# Patient Record
Sex: FEMALE | Race: OTHER | HISPANIC OR LATINO
[De-identification: names, ages, dates, MRNs, and addresses within clinical notes are randomized per-mention and may not be internally consistent; named-entity substitution may affect disease eponyms.]

---

## 2018-04-11 PROBLEM — Z00.00 ENCOUNTER FOR PREVENTIVE HEALTH EXAMINATION: Status: ACTIVE | Noted: 2018-04-11

## 2018-05-01 ENCOUNTER — APPOINTMENT (OUTPATIENT)
Dept: OTOLARYNGOLOGY | Facility: CLINIC | Age: 58
End: 2018-05-01
Payer: COMMERCIAL

## 2018-05-01 VITALS
BODY MASS INDEX: 29.44 KG/M2 | WEIGHT: 160 LBS | DIASTOLIC BLOOD PRESSURE: 82 MMHG | HEIGHT: 62 IN | HEART RATE: 64 BPM | RESPIRATION RATE: 16 BRPM | SYSTOLIC BLOOD PRESSURE: 156 MMHG

## 2018-05-01 DIAGNOSIS — Z78.9 OTHER SPECIFIED HEALTH STATUS: ICD-10-CM

## 2018-05-01 PROCEDURE — 99204 OFFICE O/P NEW MOD 45 MIN: CPT

## 2018-05-01 RX ORDER — IBUPROFEN 200 MG/1
TABLET, COATED ORAL
Refills: 0 | Status: ACTIVE | COMMUNITY

## 2018-05-07 ENCOUNTER — FORM ENCOUNTER (OUTPATIENT)
Age: 58
End: 2018-05-07

## 2018-05-08 ENCOUNTER — APPOINTMENT (OUTPATIENT)
Dept: ULTRASOUND IMAGING | Facility: IMAGING CENTER | Age: 58
End: 2018-05-08
Payer: MEDICAID

## 2018-05-08 ENCOUNTER — OUTPATIENT (OUTPATIENT)
Dept: OUTPATIENT SERVICES | Facility: HOSPITAL | Age: 58
LOS: 1 days | End: 2018-05-08
Payer: MEDICAID

## 2018-05-08 ENCOUNTER — RESULT REVIEW (OUTPATIENT)
Age: 58
End: 2018-05-08

## 2018-05-08 DIAGNOSIS — E04.2 NONTOXIC MULTINODULAR GOITER: ICD-10-CM

## 2018-05-08 PROCEDURE — 88173 CYTOPATH EVAL FNA REPORT: CPT

## 2018-05-08 PROCEDURE — 10022: CPT

## 2018-05-08 PROCEDURE — 76942 ECHO GUIDE FOR BIOPSY: CPT

## 2018-05-08 PROCEDURE — 76942 ECHO GUIDE FOR BIOPSY: CPT | Mod: 26

## 2018-05-08 PROCEDURE — 88172 CYTP DX EVAL FNA 1ST EA SITE: CPT

## 2018-05-08 PROCEDURE — 88173 CYTOPATH EVAL FNA REPORT: CPT | Mod: 26

## 2018-05-09 LAB — NON-GYNECOLOGICAL CYTOLOGY STUDY: SIGNIFICANT CHANGE UP

## 2018-05-10 ENCOUNTER — RESULT REVIEW (OUTPATIENT)
Age: 58
End: 2018-05-10

## 2018-09-13 ENCOUNTER — APPOINTMENT (OUTPATIENT)
Dept: ULTRASOUND IMAGING | Facility: CLINIC | Age: 58
End: 2018-09-13
Payer: MEDICAID

## 2018-09-13 ENCOUNTER — OUTPATIENT (OUTPATIENT)
Dept: OUTPATIENT SERVICES | Facility: HOSPITAL | Age: 58
LOS: 1 days | End: 2018-09-13
Payer: MEDICAID

## 2018-09-13 DIAGNOSIS — Z00.8 ENCOUNTER FOR OTHER GENERAL EXAMINATION: ICD-10-CM

## 2018-09-13 PROCEDURE — 76536 US EXAM OF HEAD AND NECK: CPT

## 2018-09-13 PROCEDURE — 76536 US EXAM OF HEAD AND NECK: CPT | Mod: 26

## 2018-09-25 ENCOUNTER — APPOINTMENT (OUTPATIENT)
Dept: OTOLARYNGOLOGY | Facility: CLINIC | Age: 58
End: 2018-09-25
Payer: MEDICAID

## 2018-09-25 VITALS
SYSTOLIC BLOOD PRESSURE: 151 MMHG | HEIGHT: 62 IN | WEIGHT: 166.45 LBS | HEART RATE: 66 BPM | DIASTOLIC BLOOD PRESSURE: 83 MMHG | BODY MASS INDEX: 30.63 KG/M2

## 2018-09-25 PROCEDURE — 99214 OFFICE O/P EST MOD 30 MIN: CPT

## 2018-11-07 ENCOUNTER — OUTPATIENT (OUTPATIENT)
Dept: OUTPATIENT SERVICES | Facility: HOSPITAL | Age: 58
LOS: 1 days | End: 2018-11-07

## 2018-11-07 VITALS
TEMPERATURE: 98 F | DIASTOLIC BLOOD PRESSURE: 70 MMHG | HEIGHT: 58 IN | WEIGHT: 162.04 LBS | SYSTOLIC BLOOD PRESSURE: 124 MMHG | HEART RATE: 72 BPM | RESPIRATION RATE: 16 BRPM

## 2018-11-07 DIAGNOSIS — E04.2 NONTOXIC MULTINODULAR GOITER: ICD-10-CM

## 2018-11-07 DIAGNOSIS — Z98.890 OTHER SPECIFIED POSTPROCEDURAL STATES: Chronic | ICD-10-CM

## 2018-11-07 LAB
HCT VFR BLD CALC: 44.3 % — SIGNIFICANT CHANGE UP (ref 34.5–45)
HGB BLD-MCNC: 14.7 G/DL — SIGNIFICANT CHANGE UP (ref 11.5–15.5)
MCHC RBC-ENTMCNC: 30.9 PG — SIGNIFICANT CHANGE UP (ref 27–34)
MCHC RBC-ENTMCNC: 33.2 % — SIGNIFICANT CHANGE UP (ref 32–36)
MCV RBC AUTO: 93.1 FL — SIGNIFICANT CHANGE UP (ref 80–100)
NRBC # FLD: 0 — SIGNIFICANT CHANGE UP
PLATELET # BLD AUTO: 215 K/UL — SIGNIFICANT CHANGE UP (ref 150–400)
PMV BLD: 11.5 FL — SIGNIFICANT CHANGE UP (ref 7–13)
RBC # BLD: 4.76 M/UL — SIGNIFICANT CHANGE UP (ref 3.8–5.2)
RBC # FLD: 12.9 % — SIGNIFICANT CHANGE UP (ref 10.3–14.5)
WBC # BLD: 4.67 K/UL — SIGNIFICANT CHANGE UP (ref 3.8–10.5)
WBC # FLD AUTO: 4.67 K/UL — SIGNIFICANT CHANGE UP (ref 3.8–10.5)

## 2018-11-07 NOTE — H&P PST ADULT - GASTROINTESTINAL DETAILS
no guarding/no distention/no rebound tenderness/no masses palpable/soft/bowel sounds normal/nontender

## 2018-11-07 NOTE — H&P PST ADULT - NEGATIVE GASTROINTESTINAL SYMPTOMS
no change in bowel habits/no hiccoughs/no vomiting/no constipation/no abdominal pain/no melena/no jaundice/no diarrhea/no nausea

## 2018-11-07 NOTE — H&P PST ADULT - NEGATIVE OPHTHALMOLOGIC SYMPTOMS
no pain R/no photophobia/no diplopia/no blurred vision L/no lacrimation R/no irritation L/no irritation R/no lacrimation L/no discharge R/no discharge L/no pain L

## 2018-11-07 NOTE — H&P PST ADULT - HISTORY OF PRESENT ILLNESS
>1 year felt right thyroid mass, Dx 9 months ago S/P  c/O discomfort when swallowing x 6 weeks 59 y/o  female non English speaking with no significant PMH: presents to PST for pre op evaluation stated that she felt a felt right thyroid mass > 1 year. S/P biopsy of thyroid on 07/2018, Pt now C/O discomfort when swallowing x 6 weeks. Scheduled for right thyroid lobectomy on 11/21/18

## 2018-11-07 NOTE — H&P PST ADULT - RS GEN PE MLT RESP DETAILS PC
no rales/no subcutaneous emphysema/airway patent/breath sounds equal/no chest wall tenderness/no rhonchi/no wheezes/good air movement/clear to auscultation bilaterally/respirations non-labored/no intercostal retractions

## 2018-11-07 NOTE — H&P PST ADULT - PROBLEM SELECTOR PLAN 1
Scheduled for right thyroid lobectomy on 11/21/18. Pre op instructions, famotidine, chlorhexidine gluconate soap given and explained. Pt verbalized understanding.

## 2018-11-07 NOTE — H&P PST ADULT - NEGATIVE ENMT SYMPTOMS
no tinnitus/no nasal discharge/no dry mouth/no nasal congestion/no vertigo/no nasal obstruction/no post-nasal discharge/no nose bleeds/no sinus symptoms/no gum bleeding/no recurrent cold sores/no ear pain/no hearing difficulty no tinnitus/no nasal discharge/no dysphagia/no nose bleeds/no dry mouth/no ear pain/no nasal congestion/no nasal obstruction/no post-nasal discharge/no recurrent cold sores/no gum bleeding/no throat pain/no vertigo/no sinus symptoms/no hearing difficulty

## 2018-11-07 NOTE — H&P PST ADULT - NSANTHOSAYNRD_GEN_A_CORE
No. DELMY screening performed.  STOP BANG Legend: 0-2 = LOW Risk; 3-4 = INTERMEDIATE Risk; 5-8 = HIGH Risk

## 2018-11-20 ENCOUNTER — TRANSCRIPTION ENCOUNTER (OUTPATIENT)
Age: 58
End: 2018-11-20

## 2018-11-21 ENCOUNTER — APPOINTMENT (OUTPATIENT)
Dept: OTOLARYNGOLOGY | Facility: HOSPITAL | Age: 58
End: 2018-11-21

## 2018-11-21 ENCOUNTER — OUTPATIENT (OUTPATIENT)
Dept: OUTPATIENT SERVICES | Facility: HOSPITAL | Age: 58
LOS: 1 days | Discharge: HOME | End: 2018-11-21
Payer: MEDICAID

## 2018-11-21 ENCOUNTER — RESULT REVIEW (OUTPATIENT)
Age: 58
End: 2018-11-21

## 2018-11-21 VITALS
WEIGHT: 162.04 LBS | HEIGHT: 58 IN | OXYGEN SATURATION: 95 % | HEART RATE: 59 BPM | TEMPERATURE: 98 F | RESPIRATION RATE: 20 BRPM | SYSTOLIC BLOOD PRESSURE: 178 MMHG | DIASTOLIC BLOOD PRESSURE: 91 MMHG

## 2018-11-21 VITALS
TEMPERATURE: 99 F | HEART RATE: 89 BPM | SYSTOLIC BLOOD PRESSURE: 151 MMHG | DIASTOLIC BLOOD PRESSURE: 90 MMHG | OXYGEN SATURATION: 100 % | RESPIRATION RATE: 14 BRPM

## 2018-11-21 DIAGNOSIS — E04.2 NONTOXIC MULTINODULAR GOITER: ICD-10-CM

## 2018-11-21 DIAGNOSIS — Z98.890 OTHER SPECIFIED POSTPROCEDURAL STATES: Chronic | ICD-10-CM

## 2018-11-21 PROCEDURE — 60220 PARTIAL REMOVAL OF THYROID: CPT | Mod: GC,RT

## 2018-11-21 PROCEDURE — 88307 TISSUE EXAM BY PATHOLOGIST: CPT | Mod: 26

## 2018-11-21 RX ORDER — OXYCODONE HYDROCHLORIDE 5 MG/1
5 TABLET ORAL ONCE
Qty: 0 | Refills: 0 | Status: DISCONTINUED | OUTPATIENT
Start: 2018-11-21 | End: 2018-11-22

## 2018-11-21 RX ORDER — SODIUM CHLORIDE 9 MG/ML
1000 INJECTION, SOLUTION INTRAVENOUS
Qty: 0 | Refills: 0 | Status: DISCONTINUED | OUTPATIENT
Start: 2018-11-21 | End: 2018-11-21

## 2018-11-21 RX ORDER — SODIUM CHLORIDE 9 MG/ML
1000 INJECTION, SOLUTION INTRAVENOUS
Qty: 0 | Refills: 0 | Status: DISCONTINUED | OUTPATIENT
Start: 2018-11-21 | End: 2018-11-22

## 2018-11-21 RX ORDER — SODIUM CHLORIDE 9 MG/ML
3 INJECTION INTRAMUSCULAR; INTRAVENOUS; SUBCUTANEOUS ONCE
Qty: 0 | Refills: 0 | Status: DISCONTINUED | OUTPATIENT
Start: 2018-11-21 | End: 2018-11-22

## 2018-11-21 RX ORDER — OXYCODONE HYDROCHLORIDE 5 MG/1
10 TABLET ORAL ONCE
Qty: 0 | Refills: 0 | Status: DISCONTINUED | OUTPATIENT
Start: 2018-11-21 | End: 2018-11-22

## 2018-11-21 RX ORDER — FENTANYL CITRATE 50 UG/ML
25 INJECTION INTRAVENOUS
Qty: 0 | Refills: 0 | Status: DISCONTINUED | OUTPATIENT
Start: 2018-11-21 | End: 2018-11-22

## 2018-11-21 RX ADMIN — SODIUM CHLORIDE 30 MILLILITER(S): 9 INJECTION, SOLUTION INTRAVENOUS at 09:56

## 2018-11-21 RX ADMIN — SODIUM CHLORIDE 75 MILLILITER(S): 9 INJECTION, SOLUTION INTRAVENOUS at 13:59

## 2018-11-21 NOTE — ASU DISCHARGE PLAN (ADULT/PEDIATRIC). - NOTIFY
Swelling that continues/Bleeding that does not stop Fever greater than 101/Bleeding that does not stop/Inability to Tolerate Liquids or Foods/Numbness, color, or temperature change to extremity/Numbness, tingling/Persistent Nausea and Vomiting/Swelling that continues

## 2018-11-21 NOTE — ASU DISCHARGE PLAN (ADULT/PEDIATRIC). - SPECIAL INSTRUCTIONS
Ok to shower and let soap and water run over neck, do not scrub  Remove gauze/tegaderm after 24 hours, do not remove steri strips, will remove at postop visit  Resume normal diet  No exercising or heavy lifting for 2-4 weeks  Percocet: 1 tablet every 4-6 hours as needed for pain Ok to shower and let soap and water run over neck, do not scrub  Remove gauze/tegaderm after 24 hours, do not remove steri strips, will remove at postop visit  Resume normal diet  No exercising or heavy lifting for 2-4 weeks  Percocet: 1 tablet every 4-6 hours as needed for pain  Call MD for any neck swelling, any shortness of breath, or any reddness/drainage from wound. Stay away from hot, spicy and jagged edged foods.  Call MD for any nasal tip, fingertip or extremity numbness/tingling.

## 2018-11-21 NOTE — ASU DISCHARGE PLAN (ADULT/PEDIATRIC). - NURSING INSTRUCTIONS
When taking pain meds - take with food and know it may cause constipation. To prevent constipation increase fluids and fiber in diet. - Do NOT drive while on narcotics.   You were given IV Tylenol for pain management.  Please DO NOT take tylenol or products that contain tylenol for the next 6-8 hours (until 4:30pm ). Please do not exceed 4000mg in 24hours.

## 2018-11-21 NOTE — ASU DISCHARGE PLAN (ADULT/PEDIATRIC). - MEDICATION SUMMARY - MEDICATIONS TO TAKE
I will START or STAY ON the medications listed below when I get home from the hospital:    Percocet 5/325 oral tablet  -- 1 tab(s) by mouth every 4 hours, As Needed MDD:6   -- Caution federal law prohibits the transfer of this drug to any person other  than the person for whom it was prescribed.  May cause drowsiness.  Alcohol may intensify this effect.  Use care when operating dangerous machinery.  This prescription cannot be refilled.  This product contains acetaminophen.  Do not use  with any other product containing acetaminophen to prevent possible liver damage.  Using more of this medication than prescribed may cause serious breathing problems.    -- Indication: For pain

## 2018-11-26 PROBLEM — E04.2 NONTOXIC MULTINODULAR GOITER: Chronic | Status: ACTIVE | Noted: 2018-11-07

## 2018-11-26 PROBLEM — E66.9 OBESITY, UNSPECIFIED: Chronic | Status: ACTIVE | Noted: 2018-11-07

## 2018-11-28 ENCOUNTER — APPOINTMENT (OUTPATIENT)
Dept: OTOLARYNGOLOGY | Facility: CLINIC | Age: 58
End: 2018-11-28
Payer: MEDICAID

## 2018-11-28 PROCEDURE — 99024 POSTOP FOLLOW-UP VISIT: CPT

## 2018-11-29 LAB — SURGICAL PATHOLOGY STUDY: SIGNIFICANT CHANGE UP

## 2019-01-29 ENCOUNTER — APPOINTMENT (OUTPATIENT)
Dept: OTOLARYNGOLOGY | Facility: CLINIC | Age: 59
End: 2019-01-29
Payer: MEDICAID

## 2019-01-29 VITALS
BODY MASS INDEX: 30.55 KG/M2 | HEIGHT: 62 IN | SYSTOLIC BLOOD PRESSURE: 140 MMHG | DIASTOLIC BLOOD PRESSURE: 75 MMHG | RESPIRATION RATE: 16 BRPM | WEIGHT: 166 LBS | HEART RATE: 70 BPM

## 2019-01-29 DIAGNOSIS — E04.2 NONTOXIC MULTINODULAR GOITER: ICD-10-CM

## 2019-01-29 PROCEDURE — 99024 POSTOP FOLLOW-UP VISIT: CPT

## 2019-01-29 NOTE — REASON FOR VISIT
[Post-Operative Visit] : a post-operative visit [FreeTextEntry2] : 2 months post right thyroid lobectomy

## 2019-01-29 NOTE — CONSULT LETTER
[Dear  ___] : Dear  [unfilled], [Courtesy Letter:] : I had the pleasure of seeing your patient, [unfilled], in my office today. [Please see my note below.] : Please see my note below. [Sincerely,] : Sincerely, [FreeTextEntry2] : Pepe Lujan MD ([place], [place]) [FreeTextEntry3] : Genaro Ray MD

## 2019-01-29 NOTE — HISTORY OF PRESENT ILLNESS
[de-identified] : Mrs. Kirby presents for her two month post operative visit s/p right thyroid lobectomy without any complaints.  She is under the care of her PCP, Dr. Pepe Lujan whom she saw yesterday.  She had blood work and was found be hypothyroid with a TSH of 9.620 and reports being started on Synthroid 50mcg daily.  She was also found to have an iPTH of 67.  Denies any pain or discomfort.  surgery was done for enlarging R nodule and swallowing difficulty.   [Neck Mass] : no neck mass [Difficulty Swallowing] : no difficulty swallowing [Painful Swallowing] : no painful swallowing

## 2019-07-30 ENCOUNTER — APPOINTMENT (OUTPATIENT)
Dept: OTOLARYNGOLOGY | Facility: CLINIC | Age: 59
End: 2019-07-30

## 2021-12-04 NOTE — H&P PST ADULT - RESPIRATORY AND THORAX
Follow-up for a repeat moderna vaccine 28 days after this first one.  You can get that at any pharmacy or at your doctor's office.   details…

## 2025-03-25 ENCOUNTER — OFFICE (OUTPATIENT)
Facility: LOCATION | Age: 65
Setting detail: OPHTHALMOLOGY
End: 2025-03-25
Payer: COMMERCIAL

## 2025-03-25 ENCOUNTER — RX ONLY (RX ONLY)
Age: 65
End: 2025-03-25

## 2025-03-25 DIAGNOSIS — H25.13: ICD-10-CM

## 2025-03-25 DIAGNOSIS — H33.322: ICD-10-CM

## 2025-03-25 DIAGNOSIS — H11.041: ICD-10-CM

## 2025-03-25 DIAGNOSIS — D31.02: ICD-10-CM

## 2025-03-25 DIAGNOSIS — H43.813: ICD-10-CM

## 2025-03-25 PROCEDURE — 92134 CPTRZ OPH DX IMG PST SGM RTA: CPT | Performed by: OPHTHALMOLOGY

## 2025-03-25 PROCEDURE — 92004 COMPRE OPH EXAM NEW PT 1/>: CPT | Performed by: OPHTHALMOLOGY

## 2025-03-25 ASSESSMENT — VISUAL ACUITY
OD_BCVA: 20/40-1
OS_BCVA: 20/30+2

## 2025-03-25 ASSESSMENT — KERATOMETRY
OS_K2POWER_DIOPTERS: 43.25
OS_K1POWER_DIOPTERS: 42.75
OD_AXISANGLE_DEGREES: 115
OS_AXISANGLE_DEGREES: 085
OD_K1POWER_DIOPTERS: 43.50
OD_K2POWER_DIOPTERS: 44.00

## 2025-03-25 ASSESSMENT — REFRACTION_AUTOREFRACTION
OS_CYLINDER: -0.25
OD_CYLINDER: -0.25
OD_SPHERE: +1.00
OS_SPHERE: +1.50
OD_AXIS: 085
OS_AXIS: 010

## 2025-03-25 ASSESSMENT — TONOMETRY
OD_IOP_MMHG: 11
OS_IOP_MMHG: 11

## 2025-03-25 ASSESSMENT — CONFRONTATIONAL VISUAL FIELD TEST (CVF)
OS_FINDINGS: FULL
OD_FINDINGS: FULL

## 2025-03-25 ASSESSMENT — CORNEAL PTERYGIUM: OD_PTERYGIUM: NASAL 1MM

## 2025-03-25 ASSESSMENT — LID EXAM ASSESSMENTS
OS_BLEPHARITIS: LUL 2+
OD_BLEPHARITIS: RUL 2+

## 2025-04-01 ENCOUNTER — OFFICE (OUTPATIENT)
Facility: LOCATION | Age: 65
Setting detail: OPHTHALMOLOGY
End: 2025-04-01
Payer: COMMERCIAL

## 2025-04-01 DIAGNOSIS — H43.813: ICD-10-CM

## 2025-04-01 DIAGNOSIS — H33.322: ICD-10-CM

## 2025-04-01 PROBLEM — H25.13 CATARACT SENILE NUCLEAR SCLEROSIS; BOTH EYES: Status: ACTIVE | Noted: 2025-03-25

## 2025-04-01 PROBLEM — H01.001 BLEPHARITIS; RIGHT UPPER LID, LEFT UPPER LID: Status: ACTIVE | Noted: 2025-03-25

## 2025-04-01 PROBLEM — H11.041 PTERYGIUM-PERIPHERAL; RIGHT EYE: Status: ACTIVE | Noted: 2025-03-25

## 2025-04-01 PROBLEM — D31.02 NEVUS,CONJUNCTIVAL; LEFT EYE: Status: ACTIVE | Noted: 2025-03-25

## 2025-04-01 PROBLEM — H01.004 BLEPHARITIS; RIGHT UPPER LID, LEFT UPPER LID: Status: ACTIVE | Noted: 2025-03-25

## 2025-04-01 PROCEDURE — 92201 OPSCPY EXTND RTA DRAW UNI/BI: CPT | Performed by: OPHTHALMOLOGY

## 2025-04-01 PROCEDURE — 92134 CPTRZ OPH DX IMG PST SGM RTA: CPT | Performed by: OPHTHALMOLOGY

## 2025-04-01 PROCEDURE — 92014 COMPRE OPH EXAM EST PT 1/>: CPT | Mod: 25 | Performed by: OPHTHALMOLOGY

## 2025-04-01 PROCEDURE — 67145 PROPH RTA DTCHMNT PC: CPT | Mod: LT | Performed by: OPHTHALMOLOGY

## 2025-04-01 ASSESSMENT — KERATOMETRY
OS_K1POWER_DIOPTERS: 42.75
OS_K2POWER_DIOPTERS: 43.25
OS_AXISANGLE_DEGREES: 085
OD_K1POWER_DIOPTERS: 43.50
OD_AXISANGLE_DEGREES: 115
OD_K2POWER_DIOPTERS: 44.00

## 2025-04-01 ASSESSMENT — REFRACTION_AUTOREFRACTION
OD_CYLINDER: -0.25
OD_AXIS: 085
OS_CYLINDER: -0.25
OD_SPHERE: +1.00
OS_SPHERE: +1.50
OS_AXIS: 010

## 2025-04-01 ASSESSMENT — LID EXAM ASSESSMENTS
OD_BLEPHARITIS: RUL 2+
OS_BLEPHARITIS: LUL 2+

## 2025-04-01 ASSESSMENT — CONFRONTATIONAL VISUAL FIELD TEST (CVF)
OS_FINDINGS: FULL
OD_FINDINGS: FULL

## 2025-04-01 ASSESSMENT — VISUAL ACUITY
OD_BCVA: 20/40-2
OS_BCVA: 20/20-2

## 2025-04-01 ASSESSMENT — CORNEAL PTERYGIUM: OD_PTERYGIUM: NASAL 1MM

## 2025-05-05 ASSESSMENT — LID EXAM ASSESSMENTS
OD_BLEPHARITIS: RUL 2+
OS_BLEPHARITIS: LUL 2+

## 2025-05-05 ASSESSMENT — CORNEAL PTERYGIUM: OD_PTERYGIUM: NASAL 1MM

## 2025-05-06 ENCOUNTER — OFFICE (OUTPATIENT)
Dept: URBAN - METROPOLITAN AREA CLINIC 93 | Facility: CLINIC | Age: 65
Setting detail: OPHTHALMOLOGY
End: 2025-05-06
Payer: COMMERCIAL

## 2025-05-06 DIAGNOSIS — H43.813: ICD-10-CM

## 2025-05-06 DIAGNOSIS — H33.322: ICD-10-CM

## 2025-05-06 PROCEDURE — 92012 INTRM OPH EXAM EST PATIENT: CPT | Performed by: OPHTHALMOLOGY

## 2025-05-06 PROCEDURE — 92134 CPTRZ OPH DX IMG PST SGM RTA: CPT | Performed by: OPHTHALMOLOGY

## 2025-05-07 ASSESSMENT — REFRACTION_AUTOREFRACTION
OS_SPHERE: +1.50
OD_SPHERE: +1.00
OS_AXIS: 010
OD_CYLINDER: -0.25
OD_AXIS: 085
OS_CYLINDER: -0.25

## 2025-05-07 ASSESSMENT — KERATOMETRY
OD_K1POWER_DIOPTERS: 43.50
OD_K2POWER_DIOPTERS: 44.00
OS_K2POWER_DIOPTERS: 43.25
OD_AXISANGLE_DEGREES: 115
OS_K1POWER_DIOPTERS: 42.75
OS_AXISANGLE_DEGREES: 085

## 2025-05-07 ASSESSMENT — VISUAL ACUITY
OD_BCVA: 20/40-2
OS_BCVA: 20/20-2

## 2025-05-13 ENCOUNTER — DOCTOR'S OFFICE (OUTPATIENT)
Facility: LOCATION | Age: 65
Setting detail: OPHTHALMOLOGY
End: 2025-05-13
Payer: COMMERCIAL

## 2025-05-13 DIAGNOSIS — H33.322: ICD-10-CM

## 2025-05-13 DIAGNOSIS — H43.813: ICD-10-CM

## 2025-05-13 PROCEDURE — 99213 OFFICE O/P EST LOW 20 MIN: CPT | Performed by: OPHTHALMOLOGY

## 2025-05-13 PROCEDURE — 92134 CPTRZ OPH DX IMG PST SGM RTA: CPT | Performed by: OPHTHALMOLOGY

## 2025-05-13 ASSESSMENT — REFRACTION_AUTOREFRACTION
OD_CYLINDER: -0.25
OD_SPHERE: +1.00
OS_AXIS: 010
OS_SPHERE: +1.50
OS_CYLINDER: -0.25
OD_AXIS: 085

## 2025-05-13 ASSESSMENT — KERATOMETRY
OS_K1POWER_DIOPTERS: 42.75
OD_K1POWER_DIOPTERS: 43.50
OD_K2POWER_DIOPTERS: 44.00
OD_AXISANGLE_DEGREES: 115
OS_K2POWER_DIOPTERS: 43.25
OS_AXISANGLE_DEGREES: 085

## 2025-05-13 ASSESSMENT — TONOMETRY
OS_IOP_MMHG: 17
OD_IOP_MMHG: 17

## 2025-05-13 ASSESSMENT — CORNEAL PTERYGIUM: OD_PTERYGIUM: NASAL 1MM

## 2025-05-13 ASSESSMENT — VISUAL ACUITY
OS_BCVA: 20/30
OD_BCVA: 20/50-2+1

## 2025-05-13 ASSESSMENT — LID EXAM ASSESSMENTS
OD_BLEPHARITIS: RUL 2+
OS_BLEPHARITIS: LUL 2+

## 2025-08-12 ENCOUNTER — DOCTOR'S OFFICE (OUTPATIENT)
Facility: LOCATION | Age: 65
Setting detail: OPHTHALMOLOGY
End: 2025-08-12
Payer: COMMERCIAL

## 2025-08-12 DIAGNOSIS — H43.813: ICD-10-CM

## 2025-08-12 DIAGNOSIS — H33.322: ICD-10-CM

## 2025-08-12 PROCEDURE — 99214 OFFICE O/P EST MOD 30 MIN: CPT | Performed by: OPHTHALMOLOGY

## 2025-08-12 PROCEDURE — 92134 CPTRZ OPH DX IMG PST SGM RTA: CPT | Performed by: OPHTHALMOLOGY

## 2025-08-12 ASSESSMENT — REFRACTION_AUTOREFRACTION
OS_CYLINDER: -0.25
OS_SPHERE: +1.50
OD_SPHERE: +1.00
OS_AXIS: 010
OD_AXIS: 085
OD_CYLINDER: -0.25

## 2025-08-12 ASSESSMENT — KERATOMETRY
OS_K1POWER_DIOPTERS: 42.75
OD_K1POWER_DIOPTERS: 43.50
OS_K2POWER_DIOPTERS: 43.25
OD_AXISANGLE_DEGREES: 115
OD_K2POWER_DIOPTERS: 44.00
OS_AXISANGLE_DEGREES: 085

## 2025-08-12 ASSESSMENT — VISUAL ACUITY
OD_BCVA: 20/50-2
OS_BCVA: 20/30+2

## 2025-08-12 ASSESSMENT — LID EXAM ASSESSMENTS
OD_BLEPHARITIS: RUL 2+
OS_BLEPHARITIS: LUL 2+

## 2025-08-12 ASSESSMENT — CORNEAL PTERYGIUM: OD_PTERYGIUM: NASAL 1MM
